# Patient Record
Sex: MALE | Race: WHITE | NOT HISPANIC OR LATINO
[De-identification: names, ages, dates, MRNs, and addresses within clinical notes are randomized per-mention and may not be internally consistent; named-entity substitution may affect disease eponyms.]

---

## 2017-06-13 ENCOUNTER — APPOINTMENT (OUTPATIENT)
Dept: PULMONOLOGY | Facility: CLINIC | Age: 62
End: 2017-06-13

## 2017-08-01 ENCOUNTER — APPOINTMENT (OUTPATIENT)
Dept: PULMONOLOGY | Facility: CLINIC | Age: 62
End: 2017-08-01
Payer: COMMERCIAL

## 2017-08-01 VITALS
HEART RATE: 60 BPM | BODY MASS INDEX: 31.48 KG/M2 | WEIGHT: 204 LBS | RESPIRATION RATE: 16 BRPM | SYSTOLIC BLOOD PRESSURE: 138 MMHG | DIASTOLIC BLOOD PRESSURE: 80 MMHG | OXYGEN SATURATION: 97 %

## 2017-08-01 PROCEDURE — 99214 OFFICE O/P EST MOD 30 MIN: CPT

## 2019-10-08 ENCOUNTER — APPOINTMENT (OUTPATIENT)
Dept: DERMATOLOGY | Facility: CLINIC | Age: 64
End: 2019-10-08
Payer: MEDICARE

## 2019-10-08 PROCEDURE — 99203 OFFICE O/P NEW LOW 30 MIN: CPT

## 2019-10-23 ENCOUNTER — TRANSCRIPTION ENCOUNTER (OUTPATIENT)
Age: 64
End: 2019-10-23

## 2019-11-08 ENCOUNTER — APPOINTMENT (OUTPATIENT)
Dept: DERMATOLOGY | Facility: CLINIC | Age: 64
End: 2019-11-08

## 2020-06-18 ENCOUNTER — APPOINTMENT (OUTPATIENT)
Dept: PULMONOLOGY | Facility: CLINIC | Age: 65
End: 2020-06-18
Payer: MEDICARE

## 2020-06-18 VITALS
HEART RATE: 68 BPM | BODY MASS INDEX: 31.8 KG/M2 | DIASTOLIC BLOOD PRESSURE: 76 MMHG | SYSTOLIC BLOOD PRESSURE: 124 MMHG | OXYGEN SATURATION: 97 % | WEIGHT: 205 LBS | HEIGHT: 67.5 IN

## 2020-06-18 VITALS — RESPIRATION RATE: 16 BRPM

## 2020-06-18 PROCEDURE — 99214 OFFICE O/P EST MOD 30 MIN: CPT

## 2020-06-18 RX ORDER — ISOSORBIDE MONONITRATE 60 MG/1
60 TABLET, EXTENDED RELEASE ORAL
Refills: 0 | Status: ACTIVE | COMMUNITY

## 2020-06-18 RX ORDER — ROSUVASTATIN CALCIUM 20 MG/1
20 TABLET, FILM COATED ORAL
Refills: 0 | Status: ACTIVE | COMMUNITY

## 2020-06-18 RX ORDER — ZOLPIDEM TARTRATE 10 MG/1
10 TABLET, FILM COATED ORAL
Refills: 0 | Status: ACTIVE | COMMUNITY

## 2020-06-18 RX ORDER — FENOFIBRATE 145 MG/1
145 TABLET, COATED ORAL
Refills: 0 | Status: ACTIVE | COMMUNITY
Start: 2020-06-18

## 2020-06-18 RX ORDER — SITAGLIPTIN AND METFORMIN HYDROCHLORIDE 50; 500 MG/1; MG/1
50-500 TABLET, FILM COATED ORAL
Refills: 0 | Status: ACTIVE | COMMUNITY

## 2020-06-18 NOTE — PHYSICAL EXAM
[General Appearance - Well Developed] : well developed [Well Groomed] : well groomed [Normal Appearance] : normal appearance [General Appearance - In No Acute Distress] : no acute distress [General Appearance - Well Nourished] : well nourished [No Deformities] : no deformities [Normal Conjunctiva] : the conjunctiva exhibited no abnormalities [Eyelids - No Xanthelasma] : the eyelids demonstrated no xanthelasmas [FreeTextEntry1] : overweight [Cyanosis, Localized] : no localized cyanosis [Petechial Hemorrhages (___cm)] : no petechial hemorrhages [Nail Clubbing] : no clubbing of the fingernails [Skin Color & Pigmentation] : normal skin color and pigmentation [Skin Turgor] : normal skin turgor [Deep Tendon Reflexes (DTR)] : deep tendon reflexes were 2+ and symmetric [Sensation] : the sensory exam was normal to light touch and pinprick [No Focal Deficits] : no focal deficits [Affect] : the affect was normal [Impaired Insight] : insight and judgment were intact [Oriented To Time, Place, And Person] : oriented to person, place, and time [Elongated Uvula] : elongated uvula [Enlarged Base of the Tongue] : enlargement of the base of the tongue [Low Lying Soft Palate] : low lying soft palate [III] : III [Neck Circumference: ___] : neck circumference is [unfilled] [Heart Rate And Rhythm] : heart rate and rhythm were normal [Murmurs] : no murmurs present [Heart Sounds] : normal S1 and S2 [Auscultation Breath Sounds / Voice Sounds] : lungs were clear to auscultation bilaterally [Respiration, Rhythm And Depth] : normal respiratory rhythm and effort [Exaggerated Use Of Accessory Muscles For Inspiration] : no accessory muscle use [Abdomen Tenderness] : non-tender [] : no hepato-splenomegaly [Abdomen Mass (___ Cm)] : no abdominal mass palpated [Abdomen Soft] : soft [Gait - Sufficient For Exercise Testing] : the gait was sufficient for exercise testing [Abnormal Walk] : normal gait

## 2021-12-01 ENCOUNTER — APPOINTMENT (OUTPATIENT)
Dept: PULMONOLOGY | Facility: CLINIC | Age: 66
End: 2021-12-01
Payer: MEDICARE

## 2021-12-01 VITALS — OXYGEN SATURATION: 97 % | HEART RATE: 63 BPM | DIASTOLIC BLOOD PRESSURE: 62 MMHG | SYSTOLIC BLOOD PRESSURE: 122 MMHG

## 2021-12-01 DIAGNOSIS — G47.33 OBSTRUCTIVE SLEEP APNEA (ADULT) (PEDIATRIC): ICD-10-CM

## 2021-12-01 DIAGNOSIS — Z99.89 OBSTRUCTIVE SLEEP APNEA (ADULT) (PEDIATRIC): ICD-10-CM

## 2021-12-01 PROCEDURE — 99214 OFFICE O/P EST MOD 30 MIN: CPT

## 2021-12-01 NOTE — ASSESSMENT
[FreeTextEntry1] : Severe BRAD\par The patient is compliant with CPAP and benefiting from its use. Supplies were renewed.\par f/u 1 yr.  Will need new machine next year.

## 2021-12-01 NOTE — HISTORY OF PRESENT ILLNESS
[Obstructive Sleep Apnea] : obstructive sleep apnea [Lab] : lab [CPAP:] : CPAP [TextBox_100] : 5/10 [TextBox_108] : 47 [TextBox_131] : 12 [TextBox_127] : 10/21 [TextBox_129] : 11/21 [TextBox_133] : 100 [TextBox_137] : 100 [TextBox_141] : 8 [TextBox_143] : 26 [TextBox_147] : 0.1 [TextBox_165] : continues alert.  Nothing new medically.\par Current machine close to 19563 hrs.

## 2021-12-01 NOTE — PHYSICAL EXAM
[General Appearance - Well Developed] : well developed [Normal Appearance] : normal appearance [Well Groomed] : well groomed [General Appearance - Well Nourished] : well nourished [No Deformities] : no deformities [General Appearance - In No Acute Distress] : no acute distress [FreeTextEntry1] : overweight [Normal Conjunctiva] : the conjunctiva exhibited no abnormalities [Eyelids - No Xanthelasma] : the eyelids demonstrated no xanthelasmas [Nail Clubbing] : no clubbing of the fingernails [Cyanosis, Localized] : no localized cyanosis [Petechial Hemorrhages (___cm)] : no petechial hemorrhages [Skin Color & Pigmentation] : normal skin color and pigmentation [Skin Turgor] : normal skin turgor [Deep Tendon Reflexes (DTR)] : deep tendon reflexes were 2+ and symmetric [Sensation] : the sensory exam was normal to light touch and pinprick [No Focal Deficits] : no focal deficits [Oriented To Time, Place, And Person] : oriented to person, place, and time [Impaired Insight] : insight and judgment were intact [Affect] : the affect was normal [Low Lying Soft Palate] : low lying soft palate [Elongated Uvula] : elongated uvula [Enlarged Base of the Tongue] : enlargement of the base of the tongue [III] : III [Neck Circumference: ___] : neck circumference is [unfilled] [Heart Rate And Rhythm] : heart rate and rhythm were normal [Heart Sounds] : normal S1 and S2 [Murmurs] : no murmurs present [Respiration, Rhythm And Depth] : normal respiratory rhythm and effort [Exaggerated Use Of Accessory Muscles For Inspiration] : no accessory muscle use [Auscultation Breath Sounds / Voice Sounds] : lungs were clear to auscultation bilaterally [Abdomen Soft] : soft [Abdomen Tenderness] : non-tender [] : no hepato-splenomegaly [Abdomen Mass (___ Cm)] : no abdominal mass palpated [Abnormal Walk] : normal gait [Gait - Sufficient For Exercise Testing] : the gait was sufficient for exercise testing

## 2021-12-20 ENCOUNTER — APPOINTMENT (OUTPATIENT)
Dept: GASTROENTEROLOGY | Facility: CLINIC | Age: 66
End: 2021-12-20
Payer: MEDICARE

## 2021-12-20 VITALS
DIASTOLIC BLOOD PRESSURE: 74 MMHG | OXYGEN SATURATION: 98 % | TEMPERATURE: 98.3 F | HEIGHT: 68 IN | WEIGHT: 199 LBS | BODY MASS INDEX: 30.16 KG/M2 | HEART RATE: 68 BPM | SYSTOLIC BLOOD PRESSURE: 130 MMHG | RESPIRATION RATE: 14 BRPM

## 2021-12-20 DIAGNOSIS — Z78.9 OTHER SPECIFIED HEALTH STATUS: ICD-10-CM

## 2021-12-20 DIAGNOSIS — Z12.11 ENCOUNTER FOR SCREENING FOR MALIGNANT NEOPLASM OF COLON: ICD-10-CM

## 2021-12-20 DIAGNOSIS — Z86.69 PERSONAL HISTORY OF OTHER DISEASES OF THE NERVOUS SYSTEM AND SENSE ORGANS: ICD-10-CM

## 2021-12-20 PROCEDURE — 99203 OFFICE O/P NEW LOW 30 MIN: CPT

## 2021-12-20 RX ORDER — TAMSULOSIN HYDROCHLORIDE 0.4 MG/1
0.4 CAPSULE ORAL
Refills: 0 | Status: DISCONTINUED | COMMUNITY
End: 2021-12-20

## 2021-12-20 RX ORDER — POLYETHYLENE GLYCOL-3350, SODIUM CHLORIDE, POTASSIUM CHLORIDE AND SODIUM BICARBONATE 420; 11.2; 5.72; 1.48 G/438.4G; G/438.4G; G/438.4G; G/438.4G
420 POWDER, FOR SOLUTION ORAL
Qty: 4000 | Refills: 0 | Status: ACTIVE | COMMUNITY
Start: 2021-12-20 | End: 1900-01-01

## 2021-12-20 NOTE — ASSESSMENT
[FreeTextEntry1] : At this time he is due for follow-up colonoscopy which will be scheduled.  He will obtain a CBC, basic metabolic profile and prothrombin time prior. The risks, benefits, complications and possible adverse consequences associated with colonoscopy were discussed with the patient.\par

## 2021-12-20 NOTE — PHYSICAL EXAM
[General Appearance - Alert] : alert [General Appearance - In No Acute Distress] : in no acute distress [General Appearance - Well Nourished] : well nourished [General Appearance - Well Developed] : well developed [General Appearance - Well-Appearing] : healthy appearing [Sclera] : the sclera and conjunctiva were normal [PERRL With Normal Accommodation] : pupils were equal in size, round, and reactive to light [Extraocular Movements] : extraocular movements were intact [Outer Ear] : the ears and nose were normal in appearance [Hearing Threshold Finger Rub Not [place]] : hearing was normal [Examination Of The Oral Cavity] : the lips and gums were normal [Neck Appearance] : the appearance of the neck was normal [Auscultation Breath Sounds / Voice Sounds] : lungs were clear to auscultation bilaterally [Heart Rate And Rhythm] : heart rate was normal and rhythm regular [Heart Sounds] : normal S1 and S2 [Heart Sounds Gallop] : no gallops [Murmurs] : no murmurs [Heart Sounds Pericardial Friction Rub] : no pericardial rub [Bowel Sounds] : normal bowel sounds [Abdomen Soft] : soft [Abdomen Tenderness] : non-tender [Abdomen Mass (___ Cm)] : no abdominal mass palpated [Abnormal Walk] : normal gait [Nail Clubbing] : no clubbing  or cyanosis of the fingernails [Musculoskeletal - Swelling] : no joint swelling seen [Motor Tone] : muscle strength and tone were normal [Skin Color & Pigmentation] : normal skin color and pigmentation [Skin Turgor] : normal skin turgor [] : no rash [Motor Exam] : the motor exam was normal [No Focal Deficits] : no focal deficits [Oriented To Time, Place, And Person] : oriented to person, place, and time [Impaired Insight] : insight and judgment were intact [Affect] : the affect was normal

## 2021-12-20 NOTE — HISTORY OF PRESENT ILLNESS
[de-identified] : Last underwent a colonoscopy in 2016 which was essentially unremarkable.  He has a prior history of colon polyps however.  There is no family history of colon cancer.  He denies any abdominal pain, nausea, vomiting, diarrhea or constipation.  He has undergone coronary artery bypass grafting many years ago as well as placement of one coronary stent somewhere between 2011 and 2015.  He denies any chest pain or shortness of breath.  There is no rectal bleeding or melena.  His appetite and weight are stable.  He has had prostate cancer for which he was treated with radiation therapy.  He is not on any anticoagulants.  He does not take any antiplatelet agents either.

## 2022-01-06 LAB
ANION GAP SERPL CALC-SCNC: 17 MMOL/L
BASOPHILS # BLD AUTO: 0.02 K/UL
BASOPHILS NFR BLD AUTO: 0.5 %
BUN SERPL-MCNC: 19 MG/DL
CALCIUM SERPL-MCNC: 11.2 MG/DL
CHLORIDE SERPL-SCNC: 101 MMOL/L
CO2 SERPL-SCNC: 19 MMOL/L
CREAT SERPL-MCNC: 0.94 MG/DL
EOSINOPHIL # BLD AUTO: 0.06 K/UL
EOSINOPHIL NFR BLD AUTO: 1.5 %
GLUCOSE SERPL-MCNC: 181 MG/DL
HCT VFR BLD CALC: 40.2 %
HGB BLD-MCNC: 13.7 G/DL
IMM GRANULOCYTES NFR BLD AUTO: 0.5 %
INR PPP: 0.94 RATIO
LYMPHOCYTES # BLD AUTO: 0.47 K/UL
LYMPHOCYTES NFR BLD AUTO: 11.5 %
MAN DIFF?: NORMAL
MCHC RBC-ENTMCNC: 30 PG
MCHC RBC-ENTMCNC: 34.1 GM/DL
MCV RBC AUTO: 88.2 FL
MONOCYTES # BLD AUTO: 0.37 K/UL
MONOCYTES NFR BLD AUTO: 9 %
NEUTROPHILS # BLD AUTO: 3.15 K/UL
NEUTROPHILS NFR BLD AUTO: 77 %
PLATELET # BLD AUTO: 198 K/UL
POTASSIUM SERPL-SCNC: 4.6 MMOL/L
PT BLD: 11.2 SEC
RBC # BLD: 4.56 M/UL
RBC # FLD: 13.5 %
SODIUM SERPL-SCNC: 137 MMOL/L
WBC # FLD AUTO: 4.09 K/UL

## 2022-01-24 ENCOUNTER — TRANSCRIPTION ENCOUNTER (OUTPATIENT)
Age: 67
End: 2022-01-24

## 2022-02-07 ENCOUNTER — TRANSCRIPTION ENCOUNTER (OUTPATIENT)
Age: 67
End: 2022-02-07

## 2022-02-08 ENCOUNTER — APPOINTMENT (OUTPATIENT)
Dept: GASTROENTEROLOGY | Facility: GI CENTER | Age: 67
End: 2022-02-08
Payer: MEDICARE

## 2022-02-08 ENCOUNTER — OUTPATIENT (OUTPATIENT)
Dept: OUTPATIENT SERVICES | Facility: HOSPITAL | Age: 67
LOS: 1 days | End: 2022-02-08
Payer: MEDICARE

## 2022-02-08 DIAGNOSIS — Z86.010 PERSONAL HISTORY OF COLONIC POLYPS: ICD-10-CM

## 2022-02-08 LAB — GLUCOSE BLDC GLUCOMTR-MCNC: 144 MG/DL — HIGH (ref 70–99)

## 2022-02-08 PROCEDURE — 45378 DIAGNOSTIC COLONOSCOPY: CPT | Mod: PT

## 2022-02-08 NOTE — PHYSICAL EXAM
[General Appearance - Alert] : alert [General Appearance - In No Acute Distress] : in no acute distress [General Appearance - Well Nourished] : well nourished [General Appearance - Well Developed] : well developed [General Appearance - Well-Appearing] : healthy appearing [Sclera] : the sclera and conjunctiva were normal [PERRL With Normal Accommodation] : pupils were equal in size, round, and reactive to light [Extraocular Movements] : extraocular movements were intact [Outer Ear] : the ears and nose were normal in appearance [Hearing Threshold Finger Rub Not [place]] : hearing was normal [Examination Of The Oral Cavity] : the lips and gums were normal [Neck Appearance] : the appearance of the neck was normal [Auscultation Breath Sounds / Voice Sounds] : lungs were clear to auscultation bilaterally [Heart Rate And Rhythm] : heart rate was normal and rhythm regular [Heart Sounds] : normal S1 and S2 [Heart Sounds Gallop] : no gallops [Murmurs] : no murmurs [Heart Sounds Pericardial Friction Rub] : no pericardial rub [Bowel Sounds] : normal bowel sounds [Abdomen Soft] : soft [Abdomen Tenderness] : non-tender [Abdomen Mass (___ Cm)] : no abdominal mass palpated [Abnormal Walk] : normal gait [Nail Clubbing] : no clubbing  or cyanosis of the fingernails [Musculoskeletal - Swelling] : no joint swelling seen [Motor Tone] : muscle strength and tone were normal [Skin Color & Pigmentation] : normal skin color and pigmentation [Skin Turgor] : normal skin turgor [] : no rash [Motor Exam] : the motor exam was normal [No Focal Deficits] : no focal deficits [Oriented To Time, Place, And Person] : oriented to person, place, and time [Impaired Insight] : insight and judgment were intact [Affect] : the affect was normal

## 2022-02-10 PROCEDURE — G0105: CPT

## 2022-02-10 PROCEDURE — 82962 GLUCOSE BLOOD TEST: CPT

## (undated) DEVICE — VALVE ENDOSCOPE DEFENDO SINGLE USE